# Patient Record
Sex: MALE | Race: WHITE | NOT HISPANIC OR LATINO | URBAN - METROPOLITAN AREA
[De-identification: names, ages, dates, MRNs, and addresses within clinical notes are randomized per-mention and may not be internally consistent; named-entity substitution may affect disease eponyms.]

---

## 2022-10-29 ENCOUNTER — EMERGENCY (EMERGENCY)
Facility: HOSPITAL | Age: 62
LOS: 1 days | Discharge: HOME | End: 2022-10-29
Attending: EMERGENCY MEDICINE | Admitting: EMERGENCY MEDICINE
Payer: COMMERCIAL

## 2022-10-29 VITALS
SYSTOLIC BLOOD PRESSURE: 154 MMHG | OXYGEN SATURATION: 99 % | HEIGHT: 70 IN | WEIGHT: 190.04 LBS | TEMPERATURE: 97 F | DIASTOLIC BLOOD PRESSURE: 95 MMHG | RESPIRATION RATE: 16 BRPM | HEART RATE: 70 BPM

## 2022-10-29 DIAGNOSIS — Z96.659 PRESENCE OF UNSPECIFIED ARTIFICIAL KNEE JOINT: Chronic | ICD-10-CM

## 2022-10-29 PROCEDURE — 73140 X-RAY EXAM OF FINGER(S): CPT | Mod: 26,RT

## 2022-10-29 PROCEDURE — 73140 X-RAY EXAM OF FINGER(S): CPT

## 2022-10-29 PROCEDURE — 99284 EMERGENCY DEPT VISIT MOD MDM: CPT

## 2022-10-29 PROCEDURE — 99283 EMERGENCY DEPT VISIT LOW MDM: CPT | Mod: 25

## 2022-10-29 RX ADMIN — Medication 1 TABLET(S): at 13:52

## 2022-10-29 NOTE — ED PROVIDER NOTE - PATIENT PORTAL LINK FT
You can access the FollowMyHealth Patient Portal offered by [facility] by registering at the following website: [email or web address]. By joining [employer]’s FollowMyHealth portal, you will also be able to view your health information using other applications (apps) compatible with our system.

## 2022-10-29 NOTE — ED PROVIDER NOTE - CLINICAL SUMMARY MEDICAL DECISION MAKING FREE TEXT BOX
62-year-old male with no significant past medical history presents with was bitten by his friend's dog in his right thumb.  Patient complains of 2 lacerations to the volar thumb.  No numbness or tingling.  No focal weakness.  Mild discomfort to the distal thumb.  No active bleeding.  No other acute injury or complaints.  Dog is healthy, immunizations up-to-date.  Patient fully vaccinated for COVID.  No other acute complaints at this time.  Exam: Nontoxic, well-appearing.  Right thumb with 2.5 cm laceration distal to proximal with second 1 cm laceration.  Minimal separation to large laceration.  Normal distal sensation equal bilaterally.  Normal cap refill.  No tenderness to IP joint or proximal finger.  Normal rest of hand.  No other acute findings.  Acute dog bite from healthy dog.  Check x-ray finger, discuss with hand surgeon, outpatient follow-up.

## 2022-10-29 NOTE — ED ADULT NURSE NOTE - OBJECTIVE STATEMENT
Patient is 61yo M presents with c/o dog bite to right thumb. Patient reports his cousin's dog bit him, reports him and the dog are UTD on vaccines including tetanus. Patient denies other complaints. Patient has laceration to pad of right thumb.

## 2022-10-29 NOTE — ED ADULT NURSE REASSESSMENT NOTE - NS ED NURSE REASSESS COMMENT FT1
Patient soaked finger in saline and betadine. Bacitracin applied to wound, wound covered with telfa and wrapped with gauze.

## 2022-10-29 NOTE — ED ADULT TRIAGE NOTE - CHIEF COMPLAINT QUOTE
Patient is a 63yo male complaining of dog bite on right thumb Patient says it was his cousins dog and is up to date on his shots

## 2022-10-29 NOTE — ED ADULT TRIAGE NOTE - NS ED NURSE BANDS TYPE
Please make sure to see your family doctor tomorrow for follow-up, if you develop any worsening chest pain, fevers, vomiting, headaches, or abdominal pain, return to the emergency department for evaluation   Name band;

## 2022-10-29 NOTE — ED PROVIDER NOTE - NSFOLLOWUPINSTRUCTIONS_ED_ALL_ED_FT
Follow up with plastics hand. Call no monday. Return for swelling, discharge, fever, numbness/weakness, limited range of motion.     Animal Bite, Adult      Animal bite wounds can be mild or serious. It is important to get medical treatment to prevent infection. Ask your doctor if you need treatment to prevent an infection that can spread from animals to humans (rabies).      Follow these instructions at home:      Wound care    •Follow instructions from your doctor about how to take care of your wound. Make sure you:  •Wash your hands with soap and water before you change your bandage (dressing). If you cannot use soap and water, use hand .      •Change your bandage as told by your doctor.      •Leave stitches (sutures), skin glue, or skin tape (adhesive) strips in place. They may need to stay in place for 2 weeks or longer. If tape strips get loose and curl up, you may trim the loose edges. Do not remove tape strips completely unless your doctor says it is okay.      •Check your wound every day for signs of infection. Check for:  •More redness, swelling, or pain.      •More fluid or blood.      •Warmth.      •Pus or a bad smell.        Medicines     •Take or apply over-the-counter and prescription medicines only as told by your doctor.      •If you were prescribed an antibiotic, take or apply it as told by your doctor. Do not stop using the antibiotic even if your wound gets better.        General instructions      •Keep the injured area raised (elevated) above the level of your heart while you are sitting or lying down.    •If directed, put ice on the injured area.  •Put ice in a plastic bag.      •Place a towel between your skin and the bag.      •Leave the ice on for 20 minutes, 2–3 times per day.        •Keep all follow-up visits as told by your doctor. This is important.        Contact a doctor if:    •You have more redness, swelling, or pain around your wound.      •Your wound feels warm to the touch.      •You have a fever or chills.      •You have a general feeling of sickness (malaise).      •You feel sick to your stomach (nauseous).      •You throw up (vomit).      •You have pain that does not get better.        Get help right away if:    •You have a red streak going away from your wound.    •You have any of these coming from your wound:  •Non-clear fluid.      •More blood.      •Pus or a bad smell.        •You have trouble moving your injured area.      •You lose feeling (have numbness) or feel tingling anywhere on your body.        Summary    •It is important to get the right medical treatment for animal bites. Treatment can help you to not get an infection. Ask your doctor if you need treatment to prevent an infection that can spread from animals to humans (rabies).      •Check your wound every day for signs of infection, such as more redness or swelling instead of less.      •If you have a red streak going away from your wound, get medical help right away.      This information is not intended to replace advice given to you by your health care provider. Make sure you discuss any questions you have with your health care provider.

## 2022-10-29 NOTE — ED PROVIDER NOTE - OBJECTIVE STATEMENT
63 y/o right handed male without reported PMHx presents today due to dog bite of right thumb. reports it was a relatives dog, dog UTD vaccines. pt reports laceration to right thumb. UTD tetanus. Admits to mild pain, aching, 5/10. Denies numbness/weakness, discharge, fever, limited ROM.

## 2022-10-29 NOTE — ED ADULT NURSE NOTE - NSFALLRSKPASTHIST_ED_ALL_ED
ST contacting patient due to recent cancellation and to reschedule appointment. ST left VM at this date and will attempt to contact at later date.     Kenan Robles MA, CF-SLP  Speech Language Pathologist   01/03/2022    
no

## 2022-10-29 NOTE — ED PROVIDER NOTE - PHYSICAL EXAMINATION
Constitutional: Awake, Alert, non-toxic. NAD. Well appearing, well nourished.   HEAD: Normocephalic, atraumatic.   EYES: EOM intact, conjunctiva and sclera are clear bilaterally.   ENT: No rhinorrhea, patent, mucous membranes pink/moist, no drooling or stridor.   NECK: Supple, non-tender  RESPIRATORY: Normal respiratory effort  EXTREMITIES: Full passive and active ROM in all extremities; non-tender to palpation; distal pulses palpable and symmetric  SKIN: Warm, dry; good skin turgor, no apparent lesions or rashes, no ecchymosis, brisk capillary refill.  NEURO: A&O x3. Sensory and motor functions are grossly intact. Speech is normal. Appearance and judgement seem appropriate for gender and age.

## 2022-10-29 NOTE — ED PROVIDER NOTE - NS ED ATTENDING STATEMENT MOD
This was a shared visit with the AMAURI. I reviewed and verified the documentation and independently performed the documented:

## 2022-10-29 NOTE — ED PROVIDER NOTE - CARE PROVIDER_API CALL
Linnea Duncan (MD)  Plastic Surgery  [address] [place] [address], [address]  [place], [place] [id number]  Phone: [phone]  Fax: [phone]  Follow Up Time: 1-3 Days

## 2022-10-29 NOTE — ED PROVIDER NOTE - PROGRESS NOTE DETAILS
Reviewed with [facility], advised xray augmentin, no sutures. unable to apply wet read, no fx.  All questions were answered. Discussed the importance of prompt, close medical follow-up. Patient will return with any changes, concerns or persistent/worsening symptoms.  Patient verbalized understanding.